# Patient Record
Sex: FEMALE | Race: WHITE | NOT HISPANIC OR LATINO | Employment: UNEMPLOYED | URBAN - METROPOLITAN AREA
[De-identification: names, ages, dates, MRNs, and addresses within clinical notes are randomized per-mention and may not be internally consistent; named-entity substitution may affect disease eponyms.]

---

## 2017-01-26 ENCOUNTER — OFFICE VISIT (OUTPATIENT)
Dept: GYNECOLOGIC ONCOLOGY | Facility: CLINIC | Age: 57
End: 2017-01-26

## 2017-01-26 VITALS
HEART RATE: 94 BPM | BODY MASS INDEX: 35.87 KG/M2 | SYSTOLIC BLOOD PRESSURE: 175 MMHG | DIASTOLIC BLOOD PRESSURE: 83 MMHG | TEMPERATURE: 97.6 F | WEIGHT: 209 LBS | RESPIRATION RATE: 16 BRPM | OXYGEN SATURATION: 97 %

## 2017-01-26 DIAGNOSIS — C54.1 ENDOMETRIAL CANCER (HCC): ICD-10-CM

## 2017-01-26 DIAGNOSIS — Z01.419 WELL WOMAN EXAM WITH ROUTINE GYNECOLOGICAL EXAM: Primary | ICD-10-CM

## 2017-01-26 PROCEDURE — 99396 PREV VISIT EST AGE 40-64: CPT | Performed by: NURSE PRACTITIONER

## 2017-01-26 NOTE — MR AVS SNAPSHOT
Kailyn Cruz   2017 9:30 AM   Office Visit    Dept Phone:  [phone]   Encounter #:  [id number]    Provider:  ELIZABETH Iyer   Department:  [facility] GYNECOLOGIC ONCOLOGY                Your Full Care Plan              Your Updated Medication List          This list is accurate as of: 17 10:13 AM.  Always use your most recent med list.                EXCEDRIN MIGRAINE PO       levocetirizine 5 MG tablet   Commonly known as:  XYZAL       ONE-A-DAY WOMENS PO       raNITIdine 150 MG tablet   Commonly known as:  ZANTAC       SINE-OFF PO       TYLENOL PO       venlafaxine XR 75 MG 24 hr capsule   Commonly known as:  EFFEXOR-XR   Take 1 capsule by mouth daily.               You Were Diagnosed With        Codes Comments    Well woman exam with routine gynecological exam    -  Primary ICD-10-CM: Z01.419  ICD-9-CM: V72.31     Endometrial cancer     ICD-10-CM: C54.1  ICD-9-CM: 182.0       Instructions     None    Patient Instructions History      Upcoming Appointments     Visit Type Date Time Department    FOLLOW UP 2017  9:30 AM [facility]    FOLLOW UP 1 UNIT 2017  9:30 AM [facility]      MyChart Signup     [facility] [employer] allows you to send messages to your doctor, view your test results, renew your prescriptions, schedule appointments, and more. To sign up, go to [employer] and click on the Sign Up Now link in the New User? box. Enter your [employer] Activation Code exactly as it appears below along with the last four digits of your Social Security Number and your Date of Birth () to complete the sign-up process. If you do not sign up before the expiration date, you must request a new code.    [employer] Activation Code: [email or web address]  Expires: 2017 10:11 AM    If you have questions, you can email [email or web address] or call [phone] to talk to our [employer] staff. Remember, [employer] is NOT to be  used for urgent needs. For medical emergencies, dial 911.               Other Info from Your Visit           Your Appointments     Jul 27, 2017  9:30 AM EDT   FOLLOW UP with ELIZABETH Iyer   [facility] GYNECOLOGIC ONCOLOGY (--)    [address] [address] [phone]              Allergies     No Known Allergies      Reason for Visit     Gynecologic Exam with no complaints      Vital Signs     Blood Pressure Pulse Temperature Respirations Weight Oxygen Saturation    175/83 94 97.6 °F (36.4 °C) (Temporal Artery ) 16 209 lb (94.8 kg) 97%    Body Mass Index Smoking Status                35.87 kg/m2 Former Smoker          Problems and Diagnoses Noted     Endometrial cancer    Well woman exam with routine gynecological exam    -  Primary

## 2017-01-26 NOTE — PROGRESS NOTES
GYN ONCOLOGY ANNUAL WELL WOMAN VISIT      Kailyn Cruz  [id number]  1960      Chief Complaint: Gynecologic Exam (with no complaints)        History of present illness:  Kailyn Cruz is a 56 y.o. year old female who is here today for an annual exam and ongoing surveillance of Endometrial Cancer, see history below. She reports she is feeling very well today and has no complaints. She denies vaginal bleeding, pelvic pain, changes in bowel or bladder function, new or concerning lesions, and breast problems. 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   She is currently due to repeat her mammogram. Bone density scan is UTD. She continues to decline colonoscopy, despite being knowledgeable about recommendations.     Cancer History:      Endometrial cancer    2013 Surgery    RTLH/BSO with DELORIS, bilateral pelvic LND, and right ureterolysis. 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 Thickened endometrial stripe and normal adnexa upon TVUS         Obstetric History:  OB History      Para Term  AB TAB SAB Ectopic Multiple Living    0 0 0 0 0 0 0 0 0 0         Menstrual History:     No LMP recorded. Patient has had a hysterectomy.          Past Medical History   Diagnosis Date   • Anemia    • Endometrial cancer    • Fibrocystic breast disease    • GERD (gastroesophageal reflux disease)    • Sinus disease        Past Surgical History   Procedure Laterality Date   • Other surgical history       RTLH/BSO/PLND/DELORIS/R UTEROLYSIS       MEDICATIONS: The current medication list was reviewed and reconciled.     Allergies:  has No Known Allergies.    Family History   Problem Relation Age of Onset   • Cancer Maternal Grandmother      unknown female cancer       Health Maintenance:  Last  mammogram was 9/11/2015. Last DEXA was 5/2015. Last pap smear was 4/16/2015, results were  normal PAP..    Review of Systems   Constitutional: Negative for fatigue, fever and unexpected weight change.   HENT: Negative for congestion, ear pain, hearing loss, sinus pressure and trouble swallowing.    Eyes: Negative for visual disturbance.   Respiratory: Negative for cough, chest tightness, shortness of breath and wheezing.    Cardiovascular: Negative for chest pain, palpitations and leg swelling.   Gastrointestinal: Negative for abdominal distention, abdominal pain, constipation, diarrhea, nausea and vomiting.   Endocrine: Negative for cold intolerance, heat intolerance, polydipsia, polyphagia and polyuria.   Genitourinary: Negative for difficulty urinating, dyspareunia, dysuria, frequency, hematuria, pelvic pain, urgency, vaginal bleeding, vaginal discharge and vaginal pain.   Musculoskeletal: Negative for arthralgias, gait problem, joint swelling and myalgias.   Skin: Negative for color change, pallor and rash.   Neurological: Negative for dizziness, seizures, syncope, weakness, light-headedness, numbness and headaches.   Hematological: Negative for adenopathy. Does not bruise/bleed easily.   Psychiatric/Behavioral: Negative for agitation, confusion, sleep disturbance and suicidal ideas. The patient is not nervous/anxious.        Physical Exam  General Appearance:  alert, cooperative, no apparent distress and appears stated age   Neurologic/Psychiatric: A&O x 3, gait steady, appropriate affect   HEENT:  Normocephalic, without obvious abnormality, mucous membranes moist   Neck: Supple, symmetrical, trachea midline, no adenopathy;  No thyromegaly, masses, or tenderness   Back:   Symmetric, no curvature, ROM normal, no CVA tenderness   Lungs:   Clear to auscultation bilaterally; respirations regular, even, and unlabored bilaterally   Heart:  Regular rate and rhythm, no murmurs appreciated   Breasts:  Symmetrical, no  masses, no lesions and no nipple discharge   Abdomen:   Soft, non-tender, non-distended and no organomegaly   Lymph nodes: No cervical, supraclavicular, inguinal or axillary adenopathy noted   Extremities: Normal, atraumatic; no clubbing, cyanosis, or edema    Skin: No rashes, ulcers, or suspicious lesions noted   Pelvic: External Genitalia  without lesions or skin changes  Vagina  is pink, moist, without lesions.   Vaginal Cuff  Female Vaginal Cuff: smooth, intact, without visible lesions and pap obtained  Uterus  surgically absent and no palpable masses  Ovaries  surgically absent bilaterallly  Parametria  smooth  Rectovaginal  Female rectovaginal: confirms no masses or bleeding and Hemoccult negative     ECOG Performance Status: 0 - Asymptomatic    Procedure Note:  No notes on file    Assessment and Plan:    Kailyn was seen today for gynecologic exam.    Diagnoses and all orders for this visit:    Well woman exam with routine gynecological exam  -     Liquid-based Pap Smear, Screening; Future    Endometrial cancer        There are no abnormalities and no evidence of disease upon today's exam. She is not in need of any medication refills at this time. She may call in 1 week for pap smear results. Encouraged to complete mammogram soon and to call for order if she changes her mind about colonoscopy. Patient educated and counseled regarding colon cancer screenings and she continues to decline at this time. She is understanding to call with any questions, concerns, or changes in pelvic symptoms before her next regular follow-up visit.     Return in about 6 months (around 7/26/2017) for ongoing cancer surveillance.      ELIZABETH Iyer      Note: Speech recognition transcription software was used to dictate portions of this document.  An attempt at proofreading has been made though minor errors in transcription may still be present.  Please do not hesitate to call our office with any questions.

## 2017-07-27 ENCOUNTER — OFFICE VISIT (OUTPATIENT)
Dept: GYNECOLOGIC ONCOLOGY | Facility: CLINIC | Age: 57
End: 2017-07-27

## 2017-07-27 VITALS
BODY MASS INDEX: 33.96 KG/M2 | RESPIRATION RATE: 16 BRPM | TEMPERATURE: 97.7 F | WEIGHT: 198.9 LBS | OXYGEN SATURATION: 97 % | HEART RATE: 92 BPM | SYSTOLIC BLOOD PRESSURE: 178 MMHG | DIASTOLIC BLOOD PRESSURE: 84 MMHG | HEIGHT: 64 IN

## 2017-07-27 DIAGNOSIS — C54.1 ENDOMETRIAL CANCER (HCC): Primary | ICD-10-CM

## 2017-07-27 DIAGNOSIS — E66.9 OBESITY (BMI 30.0-34.9): ICD-10-CM

## 2017-07-27 PROCEDURE — 99213 OFFICE O/P EST LOW 20 MIN: CPT | Performed by: NURSE PRACTITIONER

## 2017-07-27 NOTE — PROGRESS NOTES
GYN ONCOLOGY CANCER SURVEILLANCE FOLLOW-UP    Kailyn Cruz  [id number]  1960    Chief Complaint: Follow-up (6 month)        History of present illness:  Kailyn Cruz is a 57 y.o. year old female who is here today for ongoing surveillance of Endometrial Cancer, see Cancer History. She reports she is feeling well today. She denies vaginal bleeding, pelvic pain, and changes in bowel or bladder function. She started a new job with [employer] in March, but knew this was not going to be a permanent position and is currently looking for something new. She reports a desire to work on weight loss and diet to help improve her overall health, energy level, and motivation.           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 Thickened endometrial stripe and normal adnexa upon TVUS         9/9/2013 Surgery     RTLH/BSO with DELORIS, bilateral pelvic LND, and right ureterolysis. Stage IA grade 2 (small foci suggestive of grade 3) by final path            Past Medical History:   Diagnosis Date   • Anemia    • Endometrial cancer    • Fibrocystic breast disease    • GERD (gastroesophageal reflux disease)    • Sinus disease        Past Surgical History:   Procedure Laterality Date   • OTHER SURGICAL HISTORY      RTLH/BSO/PLND/DELORIS/R UTEROLYSIS       MEDICATIONS: The current medication list was reviewed and reconciled.     Allergies:  is allergic to latex.    Family History   Problem Relation Age of Onset   • Cancer Maternal Grandmother      unknown female cancer         Review of Systems   Constitutional: Positive for fatigue. Negative for appetite change, chills, fever and unexpected weight change.   Respiratory: Negative for cough, shortness of breath and wheezing.    Cardiovascular: Negative for chest pain, palpitations and leg swelling.   Gastrointestinal: Negative for abdominal distention, abdominal pain, blood in stool,  "constipation, diarrhea, nausea and vomiting.   Endocrine: Negative.    Genitourinary: Negative for dyspareunia, dysuria, frequency, genital sores, hematuria, pelvic pain, urgency, vaginal bleeding, vaginal discharge and vaginal pain.   Musculoskeletal: Negative for arthralgias, gait problem and joint swelling.   Neurological: Negative for dizziness, seizures, syncope, weakness, light-headedness, numbness and headaches.   Hematological: Negative for adenopathy.   Psychiatric/Behavioral: Negative.        Physical Exam  Vital Signs: /84  Pulse 92  Temp 97.7 °F (36.5 °C) (Temporal Artery )   Resp 16  Ht 64\" (162.6 cm)  Wt 198 lb 14.4 oz (90.2 kg)  SpO2 97%  BMI 34.14 kg/m2   General Appearance:  alert, cooperative, no apparent distress and appears stated age   Neurologic/Psychiatric: A&O x 3, gait steady, appropriate affect   HEENT:  Normocephalic, without obvious abnormality, mucous membranes moist   Neck: Supple, symmetrical, trachea midline, no adenopathy;  No thyromegaly, masses, or tenderness   Back:   Symmetric, no curvature, ROM normal, no CVA tenderness   Lungs:   Clear to auscultation bilaterally; respirations regular, even, and unlabored bilaterally   Heart:  Regular rate and rhythm, no murmurs appreciated   Breasts:  deferred   Abdomen:   Soft, non-tender, non-distended, no organomegaly and Exam limited d/t habitus.   Lymph nodes: No cervical, supraclavicular, inguinal or axillary adenopathy noted   Extremities: Normal, atraumatic; no clubbing, cyanosis, or edema    Pelvic: External Genitalia  without lesions or skin changes  Vagina  is pink, moist, without lesions.   Vaginal Cuff  Female Vaginal Cuff: smooth, intact and without visible lesions  Uterus  surgically absent and no palpable masses  Ovaries  surgically absent bilaterallly  Parametria  smooth  Rectovaginal  Female rectovaginal: deferred     ECOG Performance Status: 0 - Asymptomatic    Procedure Note:  No notes on file      Assessment " and Plan:  Kailyn was seen today for follow-up.    Diagnoses and all orders for this visit:    Endometrial cancer    Obesity (BMI 30.0-34.9)        There is no evidence of disease upon today's exam. She is understanding to call with any changes in pelvic symptoms or general GYN concerns. She is approaching 4 years since diagnosis and treatment and doing well from a GYN perspective. She will be due for annual exam in 6 months. If doing well at that time, may go to annual cancer surveillance visits with her WWEs.     Pamphlet given with information on [facility] [employer]. Discussed this is a fitness program that offers regular gym membership, weight loss and nutrition counseling, and personal training if desired. Several of their trainers have experience working with postmenopausal women and their special needs. Kailyn is very interested and states she will look into this further.       Return in about 6 months (around 1/27/2018) for annual exam & ongoing surveillance, or PRN.      Francesca Heller, ELIZABETH        Note: Speech recognition transcription software was used to dictate portions of this document.  An attempt at proofreading has been made though minor errors in transcription may still be present.  Please do not hesitate to call our office with any questions.

## 2017-08-29 NOTE — TELEPHONE ENCOUNTER
Patient old pharmacy requested refill. New refills had been sent to [facility]. This was denied for effexor to the [facility].

## 2018-01-03 RX ORDER — VENLAFAXINE HYDROCHLORIDE 75 MG/1
75 CAPSULE, EXTENDED RELEASE ORAL DAILY
Qty: 30 CAPSULE | Refills: 11 | Status: SHIPPED | OUTPATIENT
Start: 2018-01-03 | End: 2018-10-15 | Stop reason: SDUPTHER

## 2018-01-30 ENCOUNTER — OFFICE VISIT (OUTPATIENT)
Dept: GYNECOLOGIC ONCOLOGY | Facility: CLINIC | Age: 58
End: 2018-01-30

## 2018-01-30 VITALS
HEART RATE: 100 BPM | BODY MASS INDEX: 36.19 KG/M2 | RESPIRATION RATE: 14 BRPM | HEIGHT: 64 IN | WEIGHT: 212 LBS | SYSTOLIC BLOOD PRESSURE: 200 MMHG | OXYGEN SATURATION: 98 % | DIASTOLIC BLOOD PRESSURE: 93 MMHG | TEMPERATURE: 98 F

## 2018-01-30 DIAGNOSIS — I10 HYPERTENSION, UNSPECIFIED TYPE: ICD-10-CM

## 2018-01-30 DIAGNOSIS — C54.1 ENDOMETRIAL CANCER (HCC): ICD-10-CM

## 2018-01-30 DIAGNOSIS — Z01.419 WELL WOMAN EXAM WITH ROUTINE GYNECOLOGICAL EXAM: Primary | ICD-10-CM

## 2018-01-30 PROCEDURE — 99396 PREV VISIT EST AGE 40-64: CPT | Performed by: NURSE PRACTITIONER

## 2018-01-30 RX ORDER — HYDROCHLOROTHIAZIDE 12.5 MG/1
12.5 TABLET ORAL DAILY
Qty: 30 TABLET | Refills: 0 | Status: SHIPPED | OUTPATIENT
Start: 2018-01-30

## 2018-01-30 NOTE — PROGRESS NOTES
GYN ONCOLOGY ANNUAL WELL WOMAN VISIT      Kailyn Cruz  [id number]  1960      Chief Complaint: Gynecologic Exam (no gyn complaints)        History of present illness:  Kailyn Cruz is a 57 y.o. year old female who is here today for an annual exam and ongoing surveillance for early endometrial cancer. Upon arrival today BP found to be 200/93. At her visit 6 months ago /84 and 175/83 1 year ago. She states she feels this is related to stress as she has now been unemployed since October and is caring for her mother with limited mobility. She reports she has been meaning to see her PCP (Dr. Naya Little) about her elevated BP, but was hoping to work toward weight loss so she would not have to use HTN medications. Unfortunately, because of her stressors, she has been unable to begin a more regular exercise regimen. She has also had some frequent sinus congestion with the recent weather changes and is using decongestants containing pseudoephedrine.   Otherwise, she is feeling physically well today and has no GYN complaints. She denies vaginal bleeding, pelvic pain, concerning lesions, breast concerns, or changes in bowel or bladder function. She denies SOA, chest pain, or visual changes.   Her mammogram is still overdue and she continues to decline colonoscopy despite education regarding recommendations.     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 Thickened endometrial stripe and normal adnexa upon TVUS         2013 Surgery     RTLH/BSO with DELORIS, bilateral pelvic LND, and right ureterolysis. Stage IA grade 2 (small foci suggestive of grade 3) by final path            Obstetric History:  OB History      Para Term  AB Living    0 0 0 0 0 0    SAB TAB Ectopic Multiple Live Births    0 0 0 0          Menstrual History:     No LMP recorded. Patient has had a hysterectomy.          Past  Medical History:   Diagnosis Date   • Anemia    • Endometrial cancer    • Fibrocystic breast disease    • GERD (gastroesophageal reflux disease)    • Sinus disease        Past Surgical History:   Procedure Laterality Date   • OTHER SURGICAL HISTORY      RTLH/BSO/PLND/DELORIS/R UTEROLYSIS       MEDICATIONS: The current medication list was reviewed and reconciled.     Allergies:  is allergic to latex.    Family History   Problem Relation Age of Onset   • Cancer Maternal Grandmother      unknown female cancer       Health Maintenance:  Last mammogram was 9/11/2015. Last colonoscopy was NEVER. Last DEXA was 5/2015. Last pap smear was 1/26/2017, results negative.     Review of Systems   Constitutional: Negative for fatigue, fever and unexpected weight change.   HENT: Negative for congestion, ear pain, hearing loss, sinus pressure and trouble swallowing.    Eyes: Negative for visual disturbance.   Respiratory: Negative for cough, chest tightness, shortness of breath and wheezing.    Cardiovascular: Negative for chest pain, palpitations and leg swelling.   Gastrointestinal: Negative for abdominal distention, abdominal pain, constipation, diarrhea, nausea and vomiting.   Endocrine: Negative for cold intolerance, heat intolerance, polydipsia, polyphagia and polyuria.   Genitourinary: Negative for difficulty urinating, dyspareunia, dysuria, frequency, hematuria, pelvic pain, urgency, vaginal bleeding, vaginal discharge and vaginal pain.   Musculoskeletal: Negative for arthralgias, gait problem, joint swelling and myalgias.   Skin: Negative for color change, pallor and rash.   Neurological: Negative for dizziness, seizures, syncope, weakness, light-headedness, numbness and headaches.   Hematological: Negative for adenopathy. Does not bruise/bleed easily.   Psychiatric/Behavioral: Negative for agitation, confusion, sleep disturbance and suicidal ideas. The patient is not nervous/anxious.        Physical Exam  Vital Signs: BP (!)  "200/93  Pulse 100  Temp 98 °F (36.7 °C) (Temporal Artery )   Resp 14  Ht 162.6 cm (64\")  Wt 96.2 kg (212 lb)  SpO2 98%  BMI 36.39 kg/m2   General Appearance:  alert, cooperative, no apparent distress, appears stated age and obese   Neurologic/Psychiatric: A&O x 3, gait steady, appropriate affect   HEENT:  Normocephalic, without obvious abnormality, mucous membranes moist   Neck: Supple, symmetrical, trachea midline, no adenopathy;  No thyromegaly, masses, or tenderness   Back:   Symmetric, no curvature, ROM normal, no CVA tenderness   Lungs:   Clear to auscultation bilaterally; respirations regular, even, and unlabored bilaterally   Heart:  Regular rate and rhythm, no murmurs appreciated   Breasts:  Symmetrical, no masses, no lesions and no nipple discharge   Abdomen:   Soft, non-tender, non-distended, no organomegaly and Exam limited d/t habitus.   Lymph nodes: No cervical, supraclavicular, inguinal or axillary adenopathy noted   Extremities: Normal, atraumatic; no clubbing, cyanosis, or edema    Skin: No rashes, ulcers, or suspicious lesions noted   Pelvic: External Genitalia  without lesions or skin changes  Vagina  is pink, moist, without lesions.   Vaginal Cuff  Female Vaginal Cuff: smooth, intact, without visible lesions and pap obtained  Uterus  surgically absent and no palpable masses  Ovaries  surgically absent bilaterallly  Parametria  smooth  Rectovaginal  Female rectovaginal: confirms no masses or bleeding and Hemoccult negative     ECOG Performance Status: 0 - Asymptomatic    Procedure Note:  No notes on file    Assessment and Plan:    Kailyn was seen today for gynecologic exam.    Diagnoses and all orders for this visit:    Well woman exam with routine gynecological exam    Endometrial cancer  -     Pap IG, Rfx HPV ASCU - ThinPrep Vial, Vagina; Future    Hypertension, unspecified type  -     hydrochlorothiazide (HYDRODIURIL) 12.5 MG tablet; Take 1 tablet by mouth Daily.      There is no evidence " of disease upon today's exam. She is understanding to call with any changes in pelvic symptoms or general GYN concerns. She is now approaching 5 years since completion of treatment. We will see her for one additional 6 month surveillance visit, then if doing well may go to annual exams.     Pap was done today. Call in 1 week for pap smear results.     She was encouraged to get yearly mammograms.  She should report any palpable breast lump(s) or skin changes regardless of mammographic findings.  I explained to Kailyn that notification regarding her mammogram results will come from the center performing the study.  Our office will not be routinely calling with mammogram results.  It is her responsibility to make sure that the results from the mammogram are communicated to her by the breast center.  If she has any questions about the results, she is welcome to call our office anytime.    Kailyn continues to decline colonoscopy despite being knowledgeable regarding recommendations.     Repeat DEXA at age 62 unless new problems.     Kailyn and I discussed her elevated BP. She was cautioned against using pseudoephedrine containing decongestants while HTN is uncontrolled. She was recommended to call her PCP to be seen for evaluation. HCTZ sent to local pharmacy x 30 days until she can be seen by primary care. We reviewed medication instructions, risks, benefits, and potential side effects.   She v/u and agrees to call her family practice.       Return in about 6 months (around 7/30/2018) for ongoing cancer surveillance.      ELIZABETH Iyer      Note: Speech recognition transcription software was used to dictate portions of this document.  An attempt at proofreading has been made though minor errors in transcription may still be present.  Please do not hesitate to call our office with any questions.

## 2018-02-26 RX ORDER — HYDROCHLOROTHIAZIDE 12.5 MG/1
TABLET ORAL
Qty: 30 TABLET | Refills: 0 | OUTPATIENT
Start: 2018-02-26

## 2018-08-22 ENCOUNTER — OFFICE VISIT (OUTPATIENT)
Dept: GYNECOLOGIC ONCOLOGY | Facility: CLINIC | Age: 58
End: 2018-08-22

## 2018-08-22 VITALS
RESPIRATION RATE: 16 BRPM | BODY MASS INDEX: 34.16 KG/M2 | WEIGHT: 199 LBS | HEART RATE: 79 BPM | DIASTOLIC BLOOD PRESSURE: 88 MMHG | TEMPERATURE: 98.1 F | SYSTOLIC BLOOD PRESSURE: 189 MMHG | OXYGEN SATURATION: 95 %

## 2018-08-22 DIAGNOSIS — C54.1 ENDOMETRIAL CANCER (HCC): Primary | ICD-10-CM

## 2018-08-22 PROCEDURE — 99213 OFFICE O/P EST LOW 20 MIN: CPT | Performed by: NURSE PRACTITIONER

## 2018-08-22 RX ORDER — MONTELUKAST SODIUM 10 MG/1
10 TABLET ORAL NIGHTLY
COMMUNITY

## 2018-08-22 NOTE — PROGRESS NOTES
GYN ONCOLOGY CANCER SURVEILLANCE FOLLOW-UP    Kailyn Cruz  [id number]  1960    Chief Complaint: Follow-up        History of present illness:  Kailyn Cruz is a 58 y.o. year old female who is here today for ongoing surveillance of Endometrial Cancer, see Cancer History. She is approaching 5 years since completion of treatment next month. 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 This along with caring for her mother and doing some work on both of their homes is keeping her busy. She reports she is feeling very well today and has no complaints. She denies vaginal bleeding, pelvic pain, and changes in bowel or bladder function.          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 Thickened endometrial stripe and normal adnexa upon TVUS         9/9/2013 Surgery     RTLH/BSO with DELORIS, bilateral pelvic LND, and right ureterolysis. Stage IA grade 2 (small foci suggestive of grade 3) by final path            Past Medical History:   Diagnosis Date   • Anemia    • Endometrial cancer (CMS/HCC)    • Fibrocystic breast disease    • GERD (gastroesophageal reflux disease)    • Sinus disease        Past Surgical History:   Procedure Laterality Date   • OTHER SURGICAL HISTORY      RTLH/BSO/PLND/DELORIS/R UTEROLYSIS   • WRIST SURGERY Right        MEDICATIONS: The current medication list was reviewed and reconciled.     Allergies:  is allergic to latex.    Family History   Problem Relation Age of Onset   • Cancer Maternal Grandmother         unknown female cancer       Review of Systems   Constitutional: Negative for appetite change, chills, fatigue, fever and unexpected weight change.   Respiratory: Negative for cough, shortness of breath and wheezing.    Cardiovascular: Negative for chest pain, palpitations and  leg swelling.   Gastrointestinal: Negative for abdominal distention, abdominal pain, blood in stool, constipation, diarrhea, nausea and vomiting.   Endocrine: Negative.    Genitourinary: Negative for dyspareunia, dysuria, frequency, genital sores, hematuria, pelvic pain, urgency, vaginal bleeding, vaginal discharge and vaginal pain.   Musculoskeletal: Negative for arthralgias, gait problem and joint swelling.   Neurological: Negative for dizziness, seizures, syncope, weakness, light-headedness, numbness and headaches.   Hematological: Negative for adenopathy.   Psychiatric/Behavioral: Negative.        Physical Exam  Vital Signs: BP (!) 189/88   Pulse 79   Temp 98.1 °F (36.7 °C) (Temporal Artery )   Resp 16   Wt 90.3 kg (199 lb)   SpO2 95%   BMI 34.16 kg/m²    General Appearance:  alert, cooperative, no apparent distress and appears stated age   Neurologic/Psychiatric: A&O x 3, gait steady, appropriate affect   HEENT:  Normocephalic, without obvious abnormality, mucous membranes moist   Neck: Supple, symmetrical, trachea midline, no adenopathy;  No thyromegaly, masses, or tenderness   Back:   Symmetric, no curvature, ROM normal, no CVA tenderness   Lungs:   Clear to auscultation bilaterally; respirations regular, even, and unlabored bilaterally   Heart:  Regular rate and rhythm, no murmurs appreciated   Breasts:  deferred   Abdomen:   Soft, non-tender, non-distended and no organomegaly   Lymph nodes: No cervical, supraclavicular, inguinal or axillary adenopathy noted   Extremities: Normal, atraumatic; no clubbing, cyanosis, or edema    Pelvic: External Genitalia  without lesions or skin changes  Vagina  is pink, moist, without lesions.   Vaginal Cuff  Female Vaginal Cuff: smooth, intact and without visible lesions  Uterus  surgically absent and no palpable masses  Ovaries  surgically absent bilaterallly  Parametria  smooth  Rectovaginal  Female rectovaginal: confirms no masses or bleeding     ECOG Performance  Status: 0 - Asymptomatic    Procedure Note:  No notes on file      Assessment and Plan:  Kailyn was seen today for follow-up.    Diagnoses and all orders for this visit:    Endometrial cancer (CMS/HCC)        There is no evidence of disease upon today's exam. She is now approaching 5 years form completion of treatment with surgery alone for early endometrial cancer. She may now go to annual visits. She is understanding to call with any changes in pelvic symptoms or general GYN concerns at any time between regularly scheduled visits.       Return to clinic in 1 year for Annual exam.      ELIZABETH Iyer        Note: Speech recognition transcription software was used to dictate portions of this document.  An attempt at proofreading has been made though minor errors in transcription may still be present.  Please do not hesitate to call our office with any questions.

## 2018-10-15 RX ORDER — VENLAFAXINE HYDROCHLORIDE 75 MG/1
75 CAPSULE, EXTENDED RELEASE ORAL DAILY
Qty: 90 CAPSULE | Refills: 4 | Status: SHIPPED | OUTPATIENT
Start: 2018-10-15 | End: 2019-10-16 | Stop reason: SDUPTHER

## 2019-08-29 ENCOUNTER — OFFICE VISIT (OUTPATIENT)
Dept: GYNECOLOGIC ONCOLOGY | Facility: CLINIC | Age: 59
End: 2019-08-29

## 2019-08-29 VITALS
DIASTOLIC BLOOD PRESSURE: 73 MMHG | HEIGHT: 64 IN | HEART RATE: 106 BPM | RESPIRATION RATE: 20 BRPM | BODY MASS INDEX: 35.85 KG/M2 | OXYGEN SATURATION: 99 % | TEMPERATURE: 97.6 F | WEIGHT: 210 LBS | SYSTOLIC BLOOD PRESSURE: 174 MMHG

## 2019-08-29 DIAGNOSIS — Z01.419 WELL WOMAN EXAM WITH ROUTINE GYNECOLOGICAL EXAM: Primary | ICD-10-CM

## 2019-08-29 DIAGNOSIS — Z12.31 ENCOUNTER FOR SCREENING MAMMOGRAM FOR BREAST CANCER: ICD-10-CM

## 2019-08-29 DIAGNOSIS — C54.1 ENDOMETRIAL CANCER (HCC): ICD-10-CM

## 2019-08-29 PROCEDURE — 99396 PREV VISIT EST AGE 40-64: CPT | Performed by: NURSE PRACTITIONER

## 2019-08-29 NOTE — PROGRESS NOTES
GYN ONCOLOGY ANNUAL WELL WOMAN VISIT      Kailyn Cruz  [id number]  1960      Chief Complaint: Annual Exam        History of present illness:  Kailyn Cruz is a 59 y.o. year old female who is here today for an annual exam. She has a history of endometrial cancer, see below. She reports she is feeling very well today and has no complaints. She denies vaginal bleeding, pelvic pain, changes in bowel or bladder function, new or concerning lesions, and breast problems.  Mammogram is overdue. She has never had a colonoscopy, declines referral.     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 Thickened endometrial stripe and normal adnexa upon TVUS         2013 Surgery     RTLH/BSO with DELORIS, bilateral pelvic LND, and right ureterolysis. Stage IA grade 2 (small foci suggestive of grade 3) by final path            Obstetric History:  OB History      Para Term  AB Living    0 0 0 0 0 0    SAB TAB Ectopic Molar Multiple Live Births    0 0 0   0           Menstrual History:     No LMP recorded. Patient has had a hysterectomy.          Past Medical History:   Diagnosis Date   • Anemia    • Endometrial cancer (CMS/HCC)    • Fibrocystic breast disease    • GERD (gastroesophageal reflux disease)    • Sinus disease        Past Surgical History:   Procedure Laterality Date   • OTHER SURGICAL HISTORY      RTLH/BSO/PLND/DELORIS/R UTEROLYSIS   • WRIST SURGERY Right        MEDICATIONS: The current medication list was reviewed and reconciled.     Allergies:  is allergic to latex.    Family History   Problem Relation Age of Onset   • Cancer Maternal Grandmother         unknown female cancer       Health Maintenance:  Last mammogram was 2015. Last colonoscopy was NEVER. Last DEXA was 2015. Last pap smear was 2018, results were  normal PAP..      Review of Systems   Constitutional: Negative for  "fatigue, fever and unexpected weight change.   HENT: Negative for congestion, ear pain, hearing loss, sinus pressure and trouble swallowing.    Eyes: Negative for visual disturbance.   Respiratory: Negative for cough, chest tightness, shortness of breath and wheezing.    Cardiovascular: Negative for chest pain, palpitations and leg swelling.   Gastrointestinal: Negative for abdominal distention, abdominal pain, constipation, diarrhea, nausea and vomiting.   Endocrine: Negative for cold intolerance, heat intolerance, polydipsia, polyphagia and polyuria.   Genitourinary: Negative for difficulty urinating, dyspareunia, dysuria, frequency, hematuria, pelvic pain, urgency, vaginal bleeding, vaginal discharge and vaginal pain.   Musculoskeletal: Negative for arthralgias, gait problem, joint swelling and myalgias.   Skin: Negative for color change, pallor and rash.   Neurological: Negative for dizziness, seizures, syncope, weakness, light-headedness, numbness and headaches.   Hematological: Negative for adenopathy. Does not bruise/bleed easily.   Psychiatric/Behavioral: Negative for agitation, confusion, sleep disturbance and suicidal ideas. The patient is not nervous/anxious.        Physical Exam  Vital Signs: /73   Pulse 106   Temp 97.6 °F (36.4 °C) (Temporal)   Resp 20   Ht 162.6 cm (64\")   Wt 95.3 kg (210 lb)   SpO2 99%   BMI 36.05 kg/m²   Pain Score    08/29/19 1111   PainSc: 0-No pain      General Appearance:  alert, cooperative, no apparent distress, appears stated age and obese   Neurologic/Psychiatric: A&O x 3, gait steady, appropriate affect   HEENT:  Normocephalic, without obvious abnormality, mucous membranes moist   Neck: Supple, symmetrical, trachea midline, no adenopathy;  No thyromegaly, masses, or tenderness   Back:   Symmetric, no curvature, ROM normal, no CVA tenderness   Lungs:   Clear to auscultation bilaterally; respirations regular, even, and unlabored bilaterally   Heart:  Regular rate " and rhythm, no murmurs appreciated   Breasts:  Symmetrical, no masses, no lesions and no nipple discharge   Abdomen:   Soft, non-tender, non-distended, no organomegaly and Exam limited d/t habitus.   Lymph nodes: No cervical, supraclavicular, inguinal or axillary adenopathy noted   Extremities: Normal, atraumatic; no clubbing, cyanosis, or edema    Skin: No rashes, ulcers, or suspicious lesions noted   Pelvic: External Genitalia  without lesions or skin changes  Vagina  is pink, moist, without lesions.   Vaginal Cuff  Female Vaginal Cuff: smooth, intact, without visible lesions and pap obtained  Uterus  surgically absent and no palpable masses  Ovaries  surgically absent bilaterallly  Parametria  smooth  Rectovaginal  Female rectovaginal: confirms no masses or bleeding and Hemoccult negative     ECOG Performance Status: 0 - Asymptomatic    Procedure Note:  No notes on file    Assessment and Plan:    Kailyn was seen today for annual exam.    Diagnoses and all orders for this visit:    Well woman exam with routine gynecological exam    Endometrial cancer (CMS/[facility])    Encounter for screening mammogram for breast cancer  -     Mammo Screening Digital Tomosynthesis Bilateral With CAD; Future        There is no evidence of disease upon today's exam. She is understanding to call with any changes in pelvic symptoms or general GYN concerns at any time between regularly scheduled visits.     Pap was done today. If she does not receive the results of the Pap within 2 weeks  time, she was instructed to call to find out the results.      She was encouraged to get yearly mammograms.  She should report any palpable breast lump(s) or skin changes regardless of mammographic findings.  I explained to Kailyn that notification regarding her mammogram results will come from the center performing the study.  Our office will not be routinely calling with mammogram results.  It is her responsibility to make sure that the results from the  mammogram are communicated to her by the breast center.  If she has any questions about the results, she is welcome to call our office anytime.    Kailyn continues to decline colonoscopy despite being knowledgeable regarding recommendations.      Repeat DEXA at age 62 unless new problems.       Return to clinic in 1 year for Annual exam.      ELIZABETH Iyer

## 2019-10-16 RX ORDER — VENLAFAXINE HYDROCHLORIDE 75 MG/1
75 CAPSULE, EXTENDED RELEASE ORAL DAILY
Qty: 90 CAPSULE | Refills: 4 | Status: SHIPPED | OUTPATIENT
Start: 2019-10-16 | End: 2019-10-17 | Stop reason: SDUPTHER

## 2019-10-17 RX ORDER — VENLAFAXINE HYDROCHLORIDE 75 MG/1
75 CAPSULE, EXTENDED RELEASE ORAL DAILY
Qty: 90 CAPSULE | Refills: 4 | Status: SHIPPED | OUTPATIENT
Start: 2019-10-17 | End: 2020-10-08

## 2020-10-08 RX ORDER — VENLAFAXINE HYDROCHLORIDE 75 MG/1
CAPSULE, EXTENDED RELEASE ORAL
Qty: 90 CAPSULE | Refills: 3 | Status: SHIPPED | OUTPATIENT
Start: 2020-10-08 | End: 2021-04-29

## 2021-02-22 ENCOUNTER — TRANSCRIBE ORDERS (OUTPATIENT)
Dept: ADMINISTRATIVE | Facility: HOSPITAL | Age: 61
End: 2021-02-22

## 2021-02-22 DIAGNOSIS — N64.59 BLEEDING FROM NIPPLE IN FEMALE: Primary | ICD-10-CM

## 2021-03-12 ENCOUNTER — TELEPHONE (OUTPATIENT)
Dept: MAMMOGRAPHY | Facility: HOSPITAL | Age: 61
End: 2021-03-12

## 2021-03-12 ENCOUNTER — HOSPITAL ENCOUNTER (OUTPATIENT)
Dept: MAMMOGRAPHY | Facility: HOSPITAL | Age: 61
Discharge: HOME | End: 2021-03-12

## 2021-03-12 ENCOUNTER — HOSPITAL ENCOUNTER (OUTPATIENT)
Dept: ULTRASOUND IMAGING | Facility: HOSPITAL | Age: 61
Discharge: HOME | End: 2021-03-12

## 2021-03-12 DIAGNOSIS — N64.59 BLEEDING FROM NIPPLE IN FEMALE: ICD-10-CM

## 2021-03-12 PROCEDURE — 76642 ULTRASOUND BREAST LIMITED: CPT | Performed by: RADIOLOGY

## 2021-03-12 PROCEDURE — G0279 TOMOSYNTHESIS, MAMMO: HCPCS

## 2021-03-12 PROCEDURE — 77066 DX MAMMO INCL CAD BI: CPT

## 2021-03-12 PROCEDURE — 76642 ULTRASOUND BREAST LIMITED: CPT

## 2021-03-12 PROCEDURE — 77062 BREAST TOMOSYNTHESIS BI: CPT | Performed by: RADIOLOGY

## 2021-03-12 PROCEDURE — 77066 DX MAMMO INCL CAD BI: CPT | Performed by: RADIOLOGY

## 2021-03-12 NOTE — TELEPHONE ENCOUNTER
Pt notified of surgical consult appointment with Dr. MANSI Avila on 3/17/21 @ 9882 . Pt given office contact & location information. Told to bring photo ID, list of prescription & OTC medications, insurance information, must wear a mask. Encouraged pt to call back or contact surgeon's office with further questions. Pt verbalized understanding.

## 2021-04-29 ENCOUNTER — TELEPHONE (OUTPATIENT)
Dept: GYNECOLOGIC ONCOLOGY | Facility: CLINIC | Age: 61
End: 2021-04-29

## 2021-04-29 RX ORDER — VENLAFAXINE HYDROCHLORIDE 37.5 MG/1
CAPSULE, EXTENDED RELEASE ORAL
Qty: 14 CAPSULE | Refills: 0 | Status: SHIPPED | OUTPATIENT
Start: 2021-04-29

## 2021-04-29 NOTE — TELEPHONE ENCOUNTER
Called pt back regarding weaning off her Effexor. I spoke with the APRN before returning call. APRN sent in rx for the lower dose of Effexor and instructed for her to take 1 capsule every day for a week and then 1 capsule every other day for 2 weeks. I relayed this to pt and pt v/u.

## 2021-04-29 NOTE — TELEPHONE ENCOUNTER
Provider: HASMUKH  Caller: [facility]  Relationship to Patient: SELF  Phone Number: [phone]    Reason for Call:  WANTS TO TALK TO YOU ABOUT COMING OFF OF THE EFFEXOR.     SHE WILL NEED A REFILL ON THIS AS SHE ONLY HAS 4 PILLS LEFT.      PHARMACY:  [id number] [place] [address] - [phone] [facility] [phone] FX